# Patient Record
Sex: FEMALE | Race: WHITE
[De-identification: names, ages, dates, MRNs, and addresses within clinical notes are randomized per-mention and may not be internally consistent; named-entity substitution may affect disease eponyms.]

---

## 2019-02-24 ENCOUNTER — HOSPITAL ENCOUNTER (EMERGENCY)
Dept: HOSPITAL 25 - UCEAST | Age: 39
Discharge: HOME | End: 2019-02-24
Payer: COMMERCIAL

## 2019-02-24 VITALS — SYSTOLIC BLOOD PRESSURE: 105 MMHG | DIASTOLIC BLOOD PRESSURE: 64 MMHG

## 2019-02-24 DIAGNOSIS — N61.0: Primary | ICD-10-CM

## 2019-02-24 DIAGNOSIS — Z79.899: ICD-10-CM

## 2019-02-24 DIAGNOSIS — E03.9: ICD-10-CM

## 2019-02-24 PROCEDURE — G0463 HOSPITAL OUTPT CLINIC VISIT: HCPCS

## 2019-02-24 PROCEDURE — 99202 OFFICE O/P NEW SF 15 MIN: CPT

## 2019-02-24 NOTE — UC
Skin Complaint HPI





- HPI Summary


HPI Summary: 


Patient  is  38 year old woman , who  present today  to the urgent care with  

left nipple  for past  3-4 days.  She reports that she was on cefdinir for  

sinusitis  which has improved and for past 3-4 days she noticed left nipple 

redness and pain which progressively got worse over the past 2 days. She got 

concerned for thrush. She  is breast-feeding her 16-year-old son and almost 

weaning off breast-feeding at this time..


She denies any nipple discharge.She pumped last night, expressed one to 2 

ounces.


Denies any fever, chills











- History of Current Complaint


Chief Complaint: UCSkin


Time Seen by Provider: 02/24/19 07:44


Stated Complaint: SKIN


Hx Obtained From: Patient


Hx Last Menstrual Period: Feb 8


Pregnant?: No


Pain Intensity: 2





- Allergy/Home Medications


Allergies/Adverse Reactions: 


 Allergies











Allergy/AdvReac Type Severity Reaction Status Date / Time


 


No Known Allergies Allergy   Verified 02/24/19 07:44











Home Medications: 


 Home Medications





Cefdinir [Cefdinir 300 MG CAP] 300 mg PO BID 02/24/19 [History Confirmed 02/24/ 19]


Levothyroxine TAB* [Synthroid TAB*] 125 mcg PO 0800 02/24/19 [History Confirmed 

02/24/19]











PMH/Surg Hx/FS Hx/Imm Hx





- Additional Past Medical History


Additional PMH: 


Hypothyroidism





Previously Healthy: Yes





- Surgical History


Surgical History: None





- Social History


Alcohol Use: Rare


Substance Use Type: None


Smoking Status (MU): Never Smoked Tobacco





Review of Systems


All Other Systems Reviewed And Are Negative: Yes


Constitutional: Positive: Negative


Skin: Positive: Other - Left nipple redness, pain and swelling


Eyes: Positive: Negative


ENT: Positive: Negative


Respiratory: Positive: Negative


Cardiovascular: Positive: Negative


Gastrointestinal: Positive: Negative


Genitourinary: Positive: Negative


Motor: Positive: Negative


Neurovascular: Positive: Negative


Musculoskeletal: Positive: Negative


Neurological: Positive: Negative


Psychological: Positive: Negative


Is Patient Immunocompromised?: No





Physical Exam





- Summary


Physical Exam Summary: 


Physical Exam: 


Const: Appears well. No signs of apparent distress present. Alert and oriented 

x 3.


Musculo: Walks with a normal gait.


Head/Face: Atraumatic, normocephalic on inspection.


Eyes: EOMI and PERRLA  in both eyes. Conjunctivae clear. No discharge noted 


ENT: Hearing normal, 


CVS:  Regular rate and Rhythm, S1S2 normal , no murmurs identified. 


Extremities: Peripheral circulation is grossly normal. Pulses 2+


Abdomen : Soft non tender ,  nondistended ,  Bowel sounds present .  No 

guarding , rebound tenderness  or  rigidity noted. 


Skin: Left breast examination chaperoned by MAGDA Roman.  Left nipple redness 

and swelling noted.  Exquisite tenderness is noted at the left nipple.  A small 

pus spot noted at the nipple.  There is no tenderness in the breast itself, no 

nodules identified.  There is no discharge from the nipple.   





No axillary lymphadenopathy noted.


Neuro: Cranial nerves  II to XII intact, motor and sensory intact.  DTR Intact  

bilaterally. Mood is normal. Affect is normal.








Triage Information Reviewed: Yes


Vital Signs: 


 Initial Vital Signs











Temp  97.9 F   02/24/19 07:39


 


Pulse  72   02/24/19 07:39


 


Resp  18   02/24/19 07:39


 


BP  105/64   02/24/19 07:39


 


Pulse Ox  97   02/24/19 07:39











Vital Signs Reviewed: Yes





Course/Dx





- Course


Course Of Treatment: During the visit today,  wediscussed the findings and 

further plan to treat it with antibiotics.  She is almost weaning off breast-

feeding at this time and right breast does not express any more milk.  We 

discussed that she should pump the left breast and discard the milk at this 

time until she is completely symptom free and completed the course of 

antibiotics.   I will prescribe the medication to the pharmacy .  Patient 

expressed understanding .





- Diagnoses


Provider Diagnosis: 


 Mastitis, Nipple infection associated with lactation








Discharge





- Sign-Out/Discharge


Documenting (check all that apply): Patient Departure


All imaging exams completed and their final reports reviewed: No Studies





- Discharge Plan


Condition: Stable


Disposition: HOME


Prescriptions: 


Cephalexin CAP* [Keflex CAP*] 500 mg PO TID 10 Days #30 cap


Patient Education Materials:  Mastitis (ED)


Referrals: 


No Primary Care Phys,NOPCP [Primary Care Provider] - 


Albaro Frias MD [Medical Doctor] - 1 Week


Additional Instructions: 


Please start taking the medication as prescribed to the pharmacy . 


Warm compresses will be helpful


Tylenol or ibuprofen as needed for pain control


pump and discard the milk until completion of antibiotics/symptom-free


Follow up with your primary care doctor/ OB doctor in 1 week


Return to Urgent care / ER if symptoms get worse. 








- Billing Disposition and Condition


Condition: STABLE


Disposition: Home

## 2019-02-24 NOTE — XMS REPORT
Continuity of Care Document (CCD)

 Created on:2019



Patient:Florina Read

Sex:Female

:1980

External Reference #:[id number]





Demographics







 Address  [address] [place] [address]

 

 Home Phone  [phone]

 

 Mobile Phone  [phone]

 

 Work Phone  [phone]

 

 Email Address  [email or web address]

 

 Preferred Language  en

 

 Marital Status  Not  or 

 

 Jehovah's witness Affiliation  Unknown

 

 Race  White

 

 Ethnic Group  Not  or 









Author







 Name  Delia Romero PA

 

 Address  [address] 



   Unavailable



   [place] [address]









Support







 Name  Relationship  Address  Phone

 

 Kevin Carcamo    Unavailable  [phone]

 

 Renetta Read  Mother  Unavailable  [phone]









Care Team Providers







 Name  Role  Phone

 

 Camila Brady MD, PHD  Care Team Information   Unavailable

 

 Camila Brady MD, PHD  Primary Care Physician  Unavailable









Payers







 Date  Identification Numbers  Payment Provider  Subscriber

 

   Policy Number: [id number]  [facility]  Florina Read









 PayID: [id number]  [address]









 [place] [address]







Advance Directives







 Description

 

 No Information Available







Problems







 Date  Description  Provider  Status

 

 Onset: 2017  Migraine without aura, not  Carol Saxena M.D.  Active



   refractory    

 

 Onset: 2018  Candidiasis of mouth  Camila Brady MD, PHD  Active

 

 Onset: 2018  Acute maxillary sinusitis  Camila Brady MD, PHD  Active







Family History







 Date  Family Member(s)  Observation  Comments

 

   Father  Father Alive & Well  

 

   Mother  Mother Alive & Well  

 

 :  (age 50  Paternal Grandfather   due to MI  



 Years)      

 

   Paternal Grandmother  Alzheimer's Disease  

 

 :  (age 86  Paternal Grandmother   due to MI  



 Years)      

 

   Maternal Grandfather  Rheumatic Fever  

 

 :  (age 91  Maternal Grandfather   due to Unknown Causes  



 Years)      

 

   Maternal Grandfather  Heart Disease  

 

   Maternal Grandfather  Heart Attack  

 

   Maternal Grandfather  Stroke  

 

 :  (age 100  Maternal Grandmother   due to Unknown Causes  



 Years)      

 

   Maternal Grandmother  Breast Cancer  

 

   Maternal Grandmother  Stroke  

 

   Maternal Grandmother  Heart Disease  







Social History







 Type  Date  Description  Comments

 

 Birth Sex    Unknown  

 

 Lives With      

 

 Diet    Patient follows no dietary  



     restrictions  

 

 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

 

 ADL's/IADL's    Independent with all ADL's  

 

 ADL's/IADL's    Independent with all IADL's  

 

 Tobacco Use  Start: Unknown  Never Smoked Cigarettes  

 

 Smoking Status  Reviewed: 19  Never Smoked Cigarettes  

 

 ETOH Use    Denies alcohol use  

 

 Tobacco Use  Start: Unknown  Patient has never smoked  







Allergies, Adverse Reactions, Alerts







 Description

 

 No Known Drug Allergies







Medications







 Medication  Date  Status  Form  Strength  Qnty  SIG  Indications  Ordering



                 Provider

 

 Cefdinir    Active  Capsules  300mg  20cap  1 tab by  PADDY Torres        s  mouth twice    MD Ana



             a day    

 

 Fluticasone    Active  Suspension  50mcg/Act  9.9un  1 spray to  PADDY Torres



 Propionate  /2019        its  each nare    MD Ana



             every day    

 

 Synthroid    Active  Tablets  125mcg  144ta  take 2            bs  tablets by    Joanie



             mouth on    , FNP



             monday and    



             wednesday,    



             and 1 tablet    



             on other    



             days    

 

                 

 

 Amoxicillin    Hx  Tablets  875mg  14tab  1 tab by  TRISTA.  Jose Antonio,



           s  mouth twice    Camila,



   -          a day    MD, PHD



   05/10              



   /2018              

 

 Saline Nasal    Hx  Solution  0.65%  1unit  2 sprays  LUCA01.Jeannine Brady



 Spray          s  intranasal    Camila,



   -          every 2    MD, PHD



   02/12          hours    



   /2019          congestion    

 

 Fluconazole    Hx  Tablets  150mg  1tabs  1 by mouth  LUCA01.  Jose Antonio          once can    Camila,



   -          repeat in 1    MD, PHD



   02/12          week    



   /2019              

 

 Levothyroxine    Hx  Tablets  125mcg  36tab  2 by mouth    [place],



 Sodium          s  MW and 1 by    Carol,



   -          mouth every    M.D.



             other day    



                 

 

 Synthroid    Hx  Tablets  100mcg  45tab  1 tablet by    [place]



           s  mouth daily    Carol,



   -          on Fri, Sun,    M.D.



   , Wed,    



             Th.    



             **Take 2    



             tablets by    



             mouth on Sat    



             and    



             Tuesday.**    



             Blood work    



             due17    

 

 Synthroid    Hx  Tablets  88mcg  30tab  Take 1  E03.9  [place],



           s  tablet by    Carol,



   -          mouth daily    M.D.



             in the    



             morning on    



             an empty    



             stomach for    



             underactive    



             thyroid    

 

 Levo-T    Hx  Tablets  88mcg    1 Tab Per    Unknown



   /          Day    



   -              



                 

 

 Prenatal +    Hx  THPK  0.267&373    Once Daily    Unknown



 Complete  /0000      mg        



 Multi/Dha/Choli  -              



 ne/Folate                

 

 Folic Acid    Hx  Tablets  1mg    Take 2    Unknown



   /          Tablets By    



   -          Mouth Every    



   05/03          Day    



   /2018              







Immunizations







 CPT Code  Status  Date  Vaccine  Lot #

 

 [id number]  Given  2017  Influenza Virus Vaccine Quadrivalent Iiv4 Split  



       Preser Free Id  

 

 [id number]  Given  2017  Influenza Virus Vaccine Split Virus Use For  [id number]



       Individual 3Yr Older  







Vital Signs







 Date  Vital  Result  Comment

 

 2019  1:41pm  BP Systolic  118 mmHg  









 BP Diastolic  74 mmHg  

 

 Body Temperature  97.2 F  

 

 Heart Rate  82 /min  

 

 Respiratory Rate  20 /min  

 

 Height  73 inches  6'1"

 

 Weight  184.00 lb  

 

 BMI (Body Mass Index)  24.3 kg/m2  

 

 BSA (Body Surface Area)  2.08 m2  

 

 Ideal body weight in kilograms  75 kg  

 

 O2 % BldC Oximetry  98 %  









 2018 10:57am  BP Systolic  112 mmHg  









 BP Diastolic  73 mmHg  

 

 Body Temperature  98.2 F  

 

 Height  73 inches  6'1"

 

 Weight  187.00 lb  

 

 BMI (Body Mass Index)  24.7 kg/m2  

 

 BSA (Body Surface Area)  2.09 m2  

 

 Ideal body weight in kilograms  75 kg  









 2017 10:57am  BP Systolic Sitting Left Arm  118 mmHg  









 BP Diastolic Sitting Left Arm  72 mmHg  

 

 Body Temperature  98.1 F  

 

 Height  73 inches  6'1"

 

 Weight  196.00 lb  

 

 BMI (Body Mass Index)  25.9 kg/m2  

 

 BSA (Body Surface Area)  2.13 m2  

 

 Ideal body weight in kilograms  75 kg  

 

 Last Menstrual Period  [id number]  









 2017  9:07am  BP Systolic Sitting Left Arm  118 mmHg  









 BP Diastolic Sitting Left Arm  70 mmHg  

 

 Height  73 inches  6'1"

 

 Weight  187.00 lb  

 

 BMI (Body Mass Index)  24.7 kg/m2  

 

 BSA (Body Surface Area)  2.09 m2  

 

 Ideal body weight in kilograms  75 kg  









 2017  2:54pm  BP Systolic  108 mmHg  









 BP Diastolic  78 mmHg  

 

 Body Temperature  98.4 F  

 

 Heart Rate  70 /min  

 

 Respiratory Rate  18 /min  

 

 Height  73 inches  6'1"

 

 Weight  178.00 lb  

 

 BMI (Body Mass Index)  23.5 kg/m2  

 

 BSA (Body Surface Area)  2.05 m2  

 

 Ideal body weight in kilograms  75 kg  

 

 O2 % BldC Oximetry  98 %  







Results







 Test  Date  Facility  Test  Result  H/L  Range  Note

 

 Laboratory test  10/16/2018  [facility]  Thyroid Stim  2.49 uIU/mL  N  0.30-4.20  1



 finding    [address]  Hormone        



     [place], [place] [address] [phone]          

 

 Hemoglobin/Hemat  10/04/2017  [facility]  Hemoglobin  11.8 gm/dL  N  11.6-15.8  2



 ocrit    [address] [place], [place] [address] [phone]          









 Hematocrit  34.9 %  Low  36.0-46.1  









 Laboratory test  10/04/2017  [facility]  Fetal Screen  NEGATIVE  N    3



 finding    [address] [place], [place] [address] [phone]          

 

 CBC  10/02/2017  [facility]  White Blood  14.4 K/uL  High  3.1-10.  



     [address]  Count      7  



     [place], [place] [address] [phone]          









 Red Blood Count  4.22 M/uL  N  3.90-5.40  

 

 Hemoglobin  13.3 gm/dL  N  11.6-15.8  

 

 Hematocrit  38.2 %  N  36.0-46.1  

 

 Mean Cell Volume  90.5 fl  N  80.9-99.0  

 

 Mean Corpuscular HGB  31.5 pg  N  25.9-32.7  

 

 Mean Corpuscular HGB Conc  34.8 g/dL  High  30.8-34.3  

 

 Platelet Count  249 K/uL  N  150-400  

 

 Red Cell Distri Width %CV  13.3 %  N  11.7-14.4  

 

 Mean Platelet Volume  12.5 fL  High  8.9-12.4  









 Type And Screen  10/02/2017  [facility]  Patient Blood Type  A NEG  N    



     [address] [place], [place] [address] [phone]          









 Antibody Screen  POSITIVE  Abnormal  Negative  









 Laboratory test  10/02/2017  [facility]  Antibody Identification  RD  N    4



 finding    [address] [place], [place] [address] [phone]          









 Treponema Antibody Cascade  Negative    Negative  5









 Laboratory test  10/02/2017  [facility]  Thyroid Stim  2.17 uIU/mL  N  0.30-4.20  6



 finding    [address]  Hormone        



     [place] [address]          



     [phone]          









 Free T4  1.14 ng/dL  N  0.76-1.46  

 

 Free T3  2.08 pg/mL  Low  2.18-3.98  









 Laboratory test  2017  [facility]  Thyroid Stim  1.36 uIU/mL  N  0.30-4.20  



 finding    [address]  Hormone        



     [place] [address]          



     [phone]          









 Free T4  1.23 ng/dL  N  0.76-1.46  

 

 Free T3  2.50 pg/mL  N  2.18-3.98  









 TSH Reflex FT4  2017  [facility]  Thyroid Stim  3.28 uIU/mL  N  0.30-4.20  



 And/Or FT3    [address]  Hormone        



     [place] [address]          



     [phone]          









 Reflex add FT3?  Y      

 

 Reflex add FT4?  Y      









 TSH Reflex  2017  [facility] [address]  Thyroid Stim  5.19 uIU/mL  High  0.30
-4.20  



 FT4 And/Or    [address] [facility]  Hormone        



 FT3    [place], [place] [address] [phone]          









 Reflex add FT3?  Y      

 

 Reflex add FT4?  Y      









 Free T3  2017  [facility] [address]  Free T3  2.58 pg/mL  N  2.18-3.98  



     [address] [place] [id number] [phone]          









 Reflex add FT3?  Y      

 

 Reflex add FT4?  Y      









 Free T4  2017  [facility] [address]  Free T4  1.15 ng/dL  N  0.76-1.46  



     [address] [place], [place] [address] [phone]          









 Reflex add FT3?  Y      

 

 Reflex add FT4?  Y      









 TSH Reflex  2017  [facility] [address]  Thyroid Stim  4.74 uIU/mL  High  0.30
-4.20  



 FT4 And/Or    [address] [facility]  Hormone        



 [place]    [place] [address]          



     [phone]          









 Reflex add FT3?  Y      

 

 Reflex add FT4?  Y      









 HCG, Quant  2017  [facility] [address]  HCG, Quant  895.0 mIU/mL  N    7



     [address] [place] [address]          



     [phone]          









 Reflex add FT3?  Y      

 

 Reflex add FT4?  Y      









 Free T3  2017  [facility] [address]  Free T3  2.34 pg/mL  N  2.18-3.98  



     [address] [place] [address]          



     [phone]          









 Reflex add FT3?  Y      

 

 Reflex add FT4?  Y      









 Free T4  2017  [facility] [address]  Free T4  1.43 ng/dL  N  0.76-1.46  



     [address] [place] [address]          



     [phone]          









 Reflex add FT3?  Y      

 

 Reflex add FT4?  Y      









 1  WAITING FOR ORDER

 

 2  TERM PREG,LABOR

 

 3  Blood Type A NEG

 

 4  PER JOLEEN SANTOS R.N. AT [facility] DEPT., PATIENT RECIEVED



   RHOGAM ON 2017 AT DR AKILA DENNISON'S OFFICE.

 

 5  Performed at:   - Lab[address]  [id number]



   : Paco Barker MD, Phone:  [phone]

 

 6  N34.9

 

 7  Approximate Gestational Age and Total BHCG Range:



   



   0.2 - 1 Week........................5-50 mIU/mL



   1 - 2 Weeks..................... mIU/mL



   2 - 3 Weeks..................100-5,000 mIU/mL



   3 - 4 Weeks.................500-10,000 mIU/mL



   4 - 5 Weeks...............1,000-50,000 mIU/mL



   5 - 6 Weeks.............10,000-100,000 mIU/mL



   6 - 8 Weeks.............15,000-200,000 mIU/mL



   2 - 3 Months............10,000-100,000 mIU/mL







Procedures







 Description

 

 No Information Available







Encounters







 Type  Date  Location  Provider  Dx  Diagnosis

 

 Office Visit  2018  [facility]  Camila Brady,  J01.01  Acute 
recurrent



   10:45a  Gerson Sultana MD, PHD    maxillary



           sinusitis









 B37.83  Candidal cheilitis









 Office  2017  [facility]  Prisca,  S40.861A  Insect bite



 Visit  10:45a  [facility]  Kim, FNP    (nonvenomous) of



     RD      right upper arm,



           init encntr









 Z33.1  Pregnant state, incidental









 Office Visit  2017  9:00a  [facility]  Carol Saxena,  E03.9  
Hypothyroidism,



     Jackson DIANA  M.D.    unspecified









 G43.009  Migraine w/o aura, not intractable, w/o status migrainosus









 Office Visit  2017  [facility]  Oma,  E03.9  Hypothyroidism,



   3:00p  [facility]  Ayala VILLANUEVA,    unspecified



     RD  FNP-C    









 Z32.01  Encounter for pregnancy test, result positive

 

 Z23  Encounter for immunization







Plan of Treatment

2019 - Delia Romero, ORTEGA01.90 Acute sinusitis, unspecifiedNew Medication:
Cefdinir 300 mg - 1 tab by mouth twice a dayFluticasone Propionate 50 mcg/Act - 
1 spray to each nare every dayComments:Complete medications as 
prescribed.Provide plenty of clear fluids. Alternate Tylenol and Motrin as 
needed. Call office as needed if symptoms worsen or persist longer than 
expected.